# Patient Record
(demographics unavailable — no encounter records)

---

## 2024-11-11 NOTE — HISTORY OF PRESENT ILLNESS
[de-identified] : Follow up for incidentally noted fluid which was only on his R at the last visit. He seems to hear well & has no hx of ear infections; says "mama" and "steve".